# Patient Record
Sex: MALE | Employment: FULL TIME | URBAN - METROPOLITAN AREA
[De-identification: names, ages, dates, MRNs, and addresses within clinical notes are randomized per-mention and may not be internally consistent; named-entity substitution may affect disease eponyms.]

---

## 2018-10-10 ENCOUNTER — OFFICE VISIT (OUTPATIENT)
Dept: FAMILY MEDICINE | Facility: CLINIC | Age: 38
End: 2018-10-10
Payer: COMMERCIAL

## 2018-10-10 VITALS
HEIGHT: 72 IN | HEART RATE: 57 BPM | BODY MASS INDEX: 22.01 KG/M2 | OXYGEN SATURATION: 100 % | WEIGHT: 162.5 LBS | SYSTOLIC BLOOD PRESSURE: 124 MMHG | TEMPERATURE: 97.3 F | DIASTOLIC BLOOD PRESSURE: 80 MMHG

## 2018-10-10 DIAGNOSIS — Z00.00 PREVENTATIVE HEALTH CARE: Primary | ICD-10-CM

## 2018-10-10 ASSESSMENT — PAIN SCALES - GENERAL: PAINLEVEL: NO PAIN (0)

## 2018-10-10 NOTE — NURSING NOTE
"38 year old  Chief Complaint   Patient presents with     Physical       Blood pressure 124/80, pulse 57, temperature 97.3  F (36.3  C), temperature source Oral, height 6' 0.24\" (183.5 cm), weight 162 lb 8 oz (73.7 kg), SpO2 100 %. Body mass index is 21.89 kg/(m^2).  Patient Active Problem List   Diagnosis     Preventative health care     Hammer toe     Rectal itching       Wt Readings from Last 2 Encounters:   10/10/18 162 lb 8 oz (73.7 kg)   12/07/16 159 lb (72.1 kg)     BP Readings from Last 3 Encounters:   10/10/18 124/80   12/07/16 135/84   02/22/16 112/72         Current Outpatient Prescriptions   Medication     Multiple Vitamins-Minerals (MULTIVITAMIN MEN PO)     hydrocortisone-pramoxine (ANALPRAM-HC) 2.5-1 % rectal cream     No current facility-administered medications for this visit.        Social History   Substance Use Topics     Smoking status: Never Smoker     Smokeless tobacco: Never Used     Alcohol use 0.6 oz/week     1 Standard drinks or equivalent per week       Health Maintenance Due   Topic Date Due     HIV SCREEN (SYSTEM ASSIGNED)  03/02/1998     PHQ-2 Q1 YR  12/07/2017     INFLUENZA VACCINE (1) 09/01/2018       No results found for: DOMINICK Mckenzie MA  October 10, 2018 8:06 AM    "

## 2018-10-10 NOTE — MR AVS SNAPSHOT
"              After Visit Summary   10/10/2018    Gustavo Martinez    MRN: [id number]           Patient Information     Date Of Birth          1980        Visit Information        Provider Department      10/10/2018 8:00 AM Erki Pratt MD [facility]        Today's Diagnoses     Preventative health care    -  1       Follow-ups after your visit        Who to contact     Please call your clinic at [phone] to:    Ask questions about your health    Make or cancel appointments    Discuss your medicines    Learn about your test results    Speak to your doctor            Additional Information About Your Visit        MyChart Information     [employer] is an electronic gateway that provides easy, online access to your medical records. With [employer], you can request a clinic appointment, read your test results, renew a prescription or communicate with your care team.     To sign up for [employer] visit the website at [email or web address]   You will be asked to enter the access code listed below, as well as some personal information. Please follow the directions to create your username and password.     Your access code is: [email or web address]  Expires: 2019 10:08 AM     Your access code will  in 90 days. If you need help or a new code, please contact your [facility] or call [phone] for assistance.        Care EveryWhere ID     This is your Care EveryWhere ID. This could be used by other organizations to access your [facility] medical records  [id number]        Your Vitals Were     Pulse Temperature Height Pulse Oximetry BMI (Body Mass Index)       57 97.3  F (36.3  C) (Oral) 6' 0.24\" (183.5 cm) 100% 21.89 kg/m2        Blood Pressure from Last 3 Encounters:   10/10/18 124/80   16 135/84   16 112/72    Weight from Last 3 Encounters:   10/10/18 162 lb 8 oz (73.7 kg)   16 159 lb (72.1 kg)   16 163 lb (73.9 kg)              Today, you had the " following     No orders found for display       Primary Care Provider Office Phone # Fax #    Erik Pratt [phone]        [address] [place] [id number]        Equal Access to Services     LEONID MCKEON : Hadii aad ku hadyannio Sotrevorali, waaxda luqadaha, qaybta kaalmada adeegyada, xochilt ottalbania looscar lanzaeliecer lockett. So [facility] [phone].    ATENCIÓN: Si habla español, tiene a brenner disposición servicios gratuitos de asistencia lingüística. Llame al [phone].    We comply with applicable federal civil rights laws and [place] laws. We do not discriminate on the basis of race, color, national origin, age, disability, sex, sexual orientation, or gender identity.            Thank you!     Thank you for choosing [facility]  for your care. Our goal is always to provide you with excellent care. Hearing back from our patients is one way we can continue to improve our services. Please take a few minutes to complete the written survey that you may receive in the mail after your visit with us. Thank you!             Your Updated Medication List - Protect others around you: Learn how to safely use, store and throw away your medicines at www.disposemymeds.org.          This list is accurate as of 10/10/18 10:08 AM.  Always use your most recent med list.                   Brand Name Dispense Instructions for use Diagnosis    hydrocortisone-pramoxine 2.5-1 % cream    ANALPRAM-HC    30 g    Place rectally 2 times daily    Rectal itching       MULTIVITAMIN MEN PO      Take by mouth daily

## 2018-10-10 NOTE — PROGRESS NOTES
"CHIEF COMPLAINT: Annual Physical      HISTORY OF PRESENT ILLNESS: Gustavo Martinez is a 38 year old male who presents for an annual physical. Overall, he is doing well and does not have any major concerns.    He has bilateral hammertoes, but they are not causing him any problems. He is still able to walk and run without pain. He reports that he is noticing more overall joint pains, but he is not concerned about it.    He flew recently, and he had some difficulty with his ears feeling plugged and not being able to \"pop\". He did have a URI at that time. However, this has since resolved.      FAMILY, SOCIAL AND PAST SURGICAL HISTORIES:  Unchanged.       MEDICATIONS:  Carefully reviewed.       HEALTHCARE MAINTENANCE:    Health Maintenance   Topic Date Due     HIV SCREEN (SYSTEM ASSIGNED)  03/02/1998     PHQ-2 Q1 YR  12/07/2017     INFLUENZA VACCINE (1) 09/01/2018     LIPID SCREEN Q5 YR MALE (SYSTEM ASSIGNED)  12/07/2021     TETANUS IMMUNIZATION (SYSTEM ASSIGNED)  09/01/2024       Eye exam: Wears glasses. Eye care up to date.  Hearing: No concerns.  Dental: Up to date.  BP: Good  BMI: Perfect  Immunizations: Due for flu vaccine.  Colonoscopy: No family hx of colon cancer, NA        REVIEW OF SYSTEMS:  A 10-point review is negative.       OBJECTIVE:    GENERAL: Gustavo is in no distress.  Affect is upbeat.     VITAL SIGNS: /80 (BP Location: Right arm, Patient Position: Chair, Cuff Size: Adult Regular)  Pulse 57  Temp 97.3  F (36.3  C) (Oral)  Ht 6' 0.24\" (183.5 cm)  Wt 162 lb 8 oz (73.7 kg)  SpO2 100%  BMI 21.89 kg/m2  HEENT:  Head is normocephalic, atraumatic. Ears clear bilaterally. No pain with palpation of the frontal or maxillary sinuses.  Eyes are grossly normal.  Nose is free of any congestion or discharge.  Teeth in good repair.  Mucous membranes are moist.  Throat looks benign.  Neck is supple without adenopathy or thyromegaly.  No carotid bruits are heard, no JVD.   BACK:  Smooth and straight.  No pain to " percussion.  No CVA tenderness.   LUNGS:  Clear to auscultation bilaterally.   HEART:  Regular rate and rhythm without murmur.   ABDOMEN:  Benign.     EXTREMITIES:  Ankles free of any edema.   SKIN:  Warm and dry.         ASSESSMENT AND PLAN: 2  1. Adult physical exam, up to date on healthcare maintenance.  2. HCM: Will receive flu vaccine at pharmacy.    Call with any problems or questions.       I, Ale Montilla, am serving as a scribe to document services personally performed by Dr. Meng Pratt, based on data collection and the provider's statements to me.